# Patient Record
Sex: MALE | Race: AMERICAN INDIAN OR ALASKA NATIVE
[De-identification: names, ages, dates, MRNs, and addresses within clinical notes are randomized per-mention and may not be internally consistent; named-entity substitution may affect disease eponyms.]

---

## 2022-05-30 ENCOUNTER — HOSPITAL ENCOUNTER (EMERGENCY)
Dept: HOSPITAL 5 - ED | Age: 32
Discharge: HOME | End: 2022-05-30
Payer: COMMERCIAL

## 2022-05-30 VITALS — SYSTOLIC BLOOD PRESSURE: 138 MMHG | DIASTOLIC BLOOD PRESSURE: 96 MMHG
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
## 2022-05-30 DIAGNOSIS — Z91.030: ICD-10-CM
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
## 2022-05-30 DIAGNOSIS — T14.8XXA: Primary | ICD-10-CM

## 2022-05-30 DIAGNOSIS — M25.521: ICD-10-CM

## 2022-05-30 DIAGNOSIS — F17.290: ICD-10-CM

## 2022-05-30 DIAGNOSIS — S62.336A: ICD-10-CM

## 2022-05-30 DIAGNOSIS — M25.511: ICD-10-CM

## 2022-05-30 PROCEDURE — 73130 X-RAY EXAM OF HAND: CPT

## 2022-05-30 PROCEDURE — 73030 X-RAY EXAM OF SHOULDER: CPT

## 2022-05-30 PROCEDURE — 29125 APPL SHORT ARM SPLINT STATIC: CPT

## 2022-05-30 PROCEDURE — 73080 X-RAY EXAM OF ELBOW: CPT

## 2022-05-30 PROCEDURE — 90471 IMMUNIZATION ADMIN: CPT

## 2022-05-30 PROCEDURE — 96372 THER/PROPH/DIAG INJ SC/IM: CPT

## 2022-05-30 PROCEDURE — 90715 TDAP VACCINE 7 YRS/> IM: CPT

## 2022-05-30 PROCEDURE — 99284 EMERGENCY DEPT VISIT MOD MDM: CPT

## 2022-05-30 NOTE — XRAY REPORT
RIGHT SHOULDER 3 VIEWS



INDICATION / CLINICAL INFORMATION: Right shoulder pain and limited range of motion.



COMPARISON: None available.

 

FINDINGS:



BONES / JOINT(S): The joint spaces are well-maintained. No significant arthritis. There is no evidenc
e of fracture, subluxation or destructive lesion.

SOFT TISSUES: No significant abnormality.



ADDITIONAL FINDINGS: The visualized right lung is clear.



IMPRESSION: No acute findings.



Signer Name: Vince Falcon MD 

Signed: 5/30/2022 11:24 AM

Workstation Name: [id number]

## 2022-05-30 NOTE — XRAY REPORT
Right hand, 3 views.



HISTORY: Injury



COMPARISON: None



FINDINGS: There is an acute fracture involving the right fifth metacarpal neck. There is mild apex do
rsal lateral angulation. No intra-articular involvement. No joint subluxation/dislocation. No additio
nal fracture. Soft tissue swelling of the ulnar aspect of the hand.



IMPRESSION: Acute mildly angulated fracture of the right fifth metacarpal neck.



Signer Name: Harshil Ireland MD 

Signed: 5/30/2022 2:50 PM

Workstation Name: [id number]

## 2022-05-30 NOTE — XRAY REPORT
RIGHT ELBOW 3 VIEWS



INDICATION / CLINICAL INFORMATION: Right elbow pain.



COMPARISON: None available.

 

FINDINGS:



BONES / JOINT(S): There is mild spurring involving the coronoid process of the proximal ulna. There i
s no evidence of acute fracture, subluxation, destructive lesion or joint effusion.

SOFT TISSUES: No significant abnormality.



ADDITIONAL FINDINGS: None.



IMPRESSION: Mild degenerative change without acute abnormality.



Signer Name: Vince Falcon MD 

Signed: 5/30/2022 11:25 AM

Workstation Name: [id number]
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
